# Patient Record
Sex: MALE | Race: WHITE | Employment: FULL TIME | URBAN - METROPOLITAN AREA
[De-identification: names, ages, dates, MRNs, and addresses within clinical notes are randomized per-mention and may not be internally consistent; named-entity substitution may affect disease eponyms.]

---

## 2018-11-09 ENCOUNTER — HOSPITAL ENCOUNTER (INPATIENT)
Facility: HOSPITAL | Age: 53
LOS: 2 days | Discharge: HOME | DRG: 440 | End: 2018-11-11
Attending: EMERGENCY MEDICINE | Admitting: HOSPITALIST
Payer: COMMERCIAL

## 2018-11-09 DIAGNOSIS — K85.90 ACUTE PANCREATITIS, UNSPECIFIED COMPLICATION STATUS, UNSPECIFIED PANCREATITIS TYPE: Primary | ICD-10-CM

## 2018-11-09 PROCEDURE — 87086 URINE CULTURE/COLONY COUNT: CPT | Performed by: INTERNAL MEDICINE

## 2018-11-09 PROCEDURE — 81001 URINALYSIS AUTO W/SCOPE: CPT | Performed by: INTERNAL MEDICINE

## 2018-11-09 PROCEDURE — 99285 EMERGENCY DEPT VISIT HI MDM: CPT

## 2018-11-09 PROCEDURE — 36415 COLL VENOUS BLD VENIPUNCTURE: CPT

## 2018-11-09 RX ORDER — SODIUM CHLORIDE 0.9 % (FLUSH) 0.9 %
3 SYRINGE (ML) INJECTION AS NEEDED
Status: DISCONTINUED | OUTPATIENT
Start: 2018-11-09 | End: 2018-11-11

## 2018-11-09 RX ORDER — SODIUM CHLORIDE 9 MG/ML
INJECTION, SOLUTION INTRAVENOUS CONTINUOUS
Status: DISCONTINUED | OUTPATIENT
Start: 2018-11-09 | End: 2018-11-11

## 2018-11-09 RX ORDER — MORPHINE SULFATE 4 MG/ML
4 INJECTION, SOLUTION INTRAMUSCULAR; INTRAVENOUS EVERY 2 HOUR PRN
Status: DISCONTINUED | OUTPATIENT
Start: 2018-11-09 | End: 2018-11-11

## 2018-11-09 RX ORDER — POLYETHYLENE GLYCOL 3350 17 G/17G
17 POWDER, FOR SOLUTION ORAL DAILY PRN
Status: DISCONTINUED | OUTPATIENT
Start: 2018-11-09 | End: 2018-11-11

## 2018-11-09 RX ORDER — MORPHINE SULFATE 2 MG/ML
2 INJECTION, SOLUTION INTRAMUSCULAR; INTRAVENOUS EVERY 2 HOUR PRN
Status: DISCONTINUED | OUTPATIENT
Start: 2018-11-09 | End: 2018-11-11

## 2018-11-09 RX ORDER — DOCUSATE SODIUM 100 MG/1
100 CAPSULE, LIQUID FILLED ORAL 2 TIMES DAILY
Status: DISCONTINUED | OUTPATIENT
Start: 2018-11-09 | End: 2018-11-11

## 2018-11-09 RX ORDER — MORPHINE SULFATE 2 MG/ML
1 INJECTION, SOLUTION INTRAMUSCULAR; INTRAVENOUS EVERY 2 HOUR PRN
Status: DISCONTINUED | OUTPATIENT
Start: 2018-11-09 | End: 2018-11-11

## 2018-11-09 RX ORDER — ACETAMINOPHEN 325 MG/1
650 TABLET ORAL EVERY 6 HOURS PRN
Status: DISCONTINUED | OUTPATIENT
Start: 2018-11-09 | End: 2018-11-11

## 2018-11-09 RX ORDER — METOCLOPRAMIDE HYDROCHLORIDE 5 MG/ML
10 INJECTION INTRAMUSCULAR; INTRAVENOUS EVERY 8 HOURS PRN
Status: DISCONTINUED | OUTPATIENT
Start: 2018-11-09 | End: 2018-11-11

## 2018-11-09 RX ORDER — ENOXAPARIN SODIUM 100 MG/ML
40 INJECTION SUBCUTANEOUS DAILY
Status: DISCONTINUED | OUTPATIENT
Start: 2018-11-09 | End: 2018-11-11

## 2018-11-09 RX ORDER — SODIUM CHLORIDE 9 MG/ML
125 INJECTION, SOLUTION INTRAVENOUS CONTINUOUS
Status: DISCONTINUED | OUTPATIENT
Start: 2018-11-09 | End: 2018-11-11

## 2018-11-09 RX ORDER — BISACODYL 10 MG
10 SUPPOSITORY, RECTAL RECTAL
Status: DISCONTINUED | OUTPATIENT
Start: 2018-11-09 | End: 2018-11-11

## 2018-11-09 RX ORDER — ONDANSETRON 2 MG/ML
4 INJECTION INTRAMUSCULAR; INTRAVENOUS EVERY 6 HOURS PRN
Status: DISCONTINUED | OUTPATIENT
Start: 2018-11-09 | End: 2018-11-11

## 2018-11-09 NOTE — ED INITIAL ASSESSMENT (HPI)
Patient presents to triage from PCP's office with complaints of abdominal pain associated with N/V onset Sunday. Had outpatient CT scan this afternoon and was diagnosed with pancreatitis.   Was told to come to ED for evaluation and possible admission per P

## 2018-11-09 NOTE — ED NOTES
Orders for admission, patient is aware of plan and ready to go upstairs.  Any questions, please call ED RN Pardeep Alvarez  at extension [phone]

## 2018-11-09 NOTE — ED PROVIDER NOTES
Patient Seen in: [facility] AND [facility] Emergency Department    History   Patient presents with:  Abdomen/Flank Pain (GI/)    Stated Complaint: abdominal pain - dx'd with pancreatitis    HPI    Patient is a 76-year-old male with a history of hyperlipidemia well-nourished. HEENT:   Head: Normocephalic and atraumatic.    Right Ear: External ear normal.   Left Ear: External ear normal.   Nose: Nose normal.   Mouth/Throat: Oropharynx is clear and moist.   Eyes: Conjunctivae and EOM are normal. Pupils are equal, unspecified pancreatitis type  (primary encounter diagnosis)    Disposition:  Admit  11/9/2018  2:32 pm    Follow-up:  No follow-up provider specified.       Medications Prescribed:  Current Discharge Medication List        Present on Admission  Date Review

## 2018-11-09 NOTE — H&P
[facility] Hospitalist H&P     CC: Patient presents with:  Abdomen/Flank Pain (GI/)       PCP: Christine Castaneda MD      Assessment and Plan     Brittney Redmond is a 46year old male with no known PMhx. Who presented with 6 days of epigastric abd pain.   CT c/w webb Dex Handy  History reviewed. No pertinent surgical history.      ALL:  No Known Allergies     Home Medications:    Outpatient Medications Marked as Taking [facility]):  ondansetron 8 MG Oral Tablet Dispersible DIS 1 T ON THE the pubic symphysis after the injection of nonionic intravenous contrast.  Oral contrast was not used for the examination. 80 mL of Isovue 370 were administered intravenously.  Automated exposure control and ALARA manual techniques for patient specific dos PERITONEUM/RETROPERITONEUM: No other free fluid or loculated fluid collections are seen in the abdomen and pelvis. ABDOMINAL WALL: Normal. OSSEOUS STRUCTURES: There are degenerative changes in the lumbar spine. IMPRESSION: 1.  Findings compatible with pa

## 2018-11-10 ENCOUNTER — APPOINTMENT (OUTPATIENT)
Dept: ULTRASOUND IMAGING | Facility: HOSPITAL | Age: 53
DRG: 440 | End: 2018-11-10
Attending: HOSPITALIST
Payer: COMMERCIAL

## 2018-11-10 PROCEDURE — 83036 HEMOGLOBIN GLYCOSYLATED A1C: CPT | Performed by: HOSPITALIST

## 2018-11-10 PROCEDURE — 80061 LIPID PANEL: CPT | Performed by: HOSPITALIST

## 2018-11-10 PROCEDURE — 83690 ASSAY OF LIPASE: CPT | Performed by: HOSPITALIST

## 2018-11-10 PROCEDURE — 76705 ECHO EXAM OF ABDOMEN: CPT | Performed by: HOSPITALIST

## 2018-11-10 PROCEDURE — 80053 COMPREHEN METABOLIC PANEL: CPT | Performed by: HOSPITALIST

## 2018-11-10 PROCEDURE — 85025 COMPLETE CBC W/AUTO DIFF WBC: CPT | Performed by: HOSPITALIST

## 2018-11-10 NOTE — CM/SW NOTE
SW received MDO for discharge planning. Per RN, pt is from home w/ spouse, self care. RN anticipates that pt will not have any discharge needs at this time. SW/CM to remain available for support and/or discharge planning.      Mark Martinez

## 2018-11-10 NOTE — PROGRESS NOTES
[facility] Hospitalist Progress Note     PCP: Violet Jackson MD    CC: Follow up       Assessment/Plan:     Josh Richardson is a 46year old male with no known PMhx. Who presented with 6 days of epigastric abd pain. CT c/w pancreatitis.   Enzymes normal     Acute perfused  Psych: mood stable, cooperative  Neuro: no focal deficits    Medications     • enoxaparin  40 mg Subcutaneous Daily   • docusate sodium  100 mg Oral BID     • sodium chloride 125 mL/hr (11/09/18 1512)   • sodium chloride 125 mL/hr at 11/10/18 083

## 2018-11-10 NOTE — RESPIRATORY THERAPY NOTE
RAMILA ASSESSMENT:    Pt does have a previous diagnosis of RAMILA. Pt does routinely use a CPAP device at home. Pt will use home CPAP per pt.

## 2018-11-10 NOTE — PLAN OF CARE
Problem: Patient Centered Care  Goal: Patient preferences are identified and integrated in the patient's plan of care  Interventions:  - What would you like us to know as we care for you?   - Provide timely, complete, and accurate information to patient/fa Administer growth factors as ordered  - Implement neutropenic guidelines  Outcome: Progressing      Problem: DISCHARGE PLANNING  Goal: Discharge to home or other facility with appropriate resources  INTERVENTIONS:  - Identify barriers to discharge w/pt and

## 2018-11-11 ENCOUNTER — APPOINTMENT (OUTPATIENT)
Dept: CT IMAGING | Facility: HOSPITAL | Age: 53
DRG: 440 | End: 2018-11-11
Attending: HOSPITALIST
Payer: COMMERCIAL

## 2018-11-11 VITALS
SYSTOLIC BLOOD PRESSURE: 119 MMHG | DIASTOLIC BLOOD PRESSURE: 67 MMHG | WEIGHT: 224 LBS | HEIGHT: 73 IN | TEMPERATURE: 99 F | BODY MASS INDEX: 29.69 KG/M2 | RESPIRATION RATE: 18 BRPM | HEART RATE: 62 BPM | OXYGEN SATURATION: 96 %

## 2018-11-11 PROCEDURE — 80076 HEPATIC FUNCTION PANEL: CPT | Performed by: HOSPITALIST

## 2018-11-11 PROCEDURE — 74177 CT ABD & PELVIS W/CONTRAST: CPT | Performed by: HOSPITALIST

## 2018-11-11 PROCEDURE — 90471 IMMUNIZATION ADMIN: CPT

## 2018-11-11 PROCEDURE — 85025 COMPLETE CBC W/AUTO DIFF WBC: CPT | Performed by: HOSPITALIST

## 2018-11-11 PROCEDURE — 80048 BASIC METABOLIC PNL TOTAL CA: CPT | Performed by: HOSPITALIST

## 2018-11-11 RX ORDER — ATORVASTATIN CALCIUM 20 MG/1
20 TABLET, FILM COATED ORAL NIGHTLY
Qty: 30 TABLET | Refills: 0 | Status: SHIPPED | OUTPATIENT
Start: 2018-11-11 | End: 2018-12-03

## 2018-11-11 NOTE — PLAN OF CARE
Problem: Patient Centered Care  Goal: Patient preferences are identified and integrated in the patient's plan of care  Interventions:  - What would you like us to know as we care for you?   - Provide timely, complete, and accurate information to patient/fa period  INTERVENTIONS  - Monitor WBC  - Administer growth factors as ordered  - Implement neutropenic guidelines  Outcome: Adequate for Discharge      Problem: DISCHARGE PLANNING  Goal: Discharge to home or other facility with appropriate resources  INTERV

## 2018-11-11 NOTE — PLAN OF CARE
Patient discharge home with wife at bedside, IV removed, informed pt and wife of new meds and to follow up with PCP.

## 2018-11-11 NOTE — PLAN OF CARE
Problem: Patient/Family Goals  Goal: Patient/Family Long Term Goal  Patient's Long Term Goal: to go home    Interventions:  - IV fluids  - SW to assist with d/c planning  - NPO  - pain management  - See additional Care Plan goals for specific interventions preferences of patient/family/discharge partner  - Complete POLST form as appropriate  - Assess patient's ability to be responsible for managing their own health  - Refer to Case Management Department for coordinating discharge planning if the patient need

## 2018-11-12 NOTE — DISCHARGE SUMMARY
[facility] Internal Medicine Discharge Summary   Patient ID:  Brittney Redmond  [id number]  23 year old  9/16/1965    Admit date: 11/9/2018    Discharge date and time: 11/11/2018  2:24 PM     Attending Physician: No att. providers found     Primary Care Physician: Tommie Gerber [address], [phone], [facility], [address] [id number]    Phone:  [phone]   · atorvastatin 20 MG Tabs  · MetFORMIN HCl 500 MG Tabs possible thrombus vs artifact but does show extensive inflammation of body and tail. No pseudocyst or necrosis but will need follow up. D/W patient  -rec follow up with PCP and GI.   Also needs screening colonoscopy    HL  -LDL, HDL and TG all not at goal MD ON 11/10/2018 AT 16:18          Ct Abdomen+pelvis(contrast Only)(cpt=74177)    Result Date: 11/11/2018  PROCEDURE: CT ABDOMEN + PELVIS (CONTRAST ONLY) (PAN=[id number])  COMPARISON: [address] [place] [address] (URS=[id number]), 11/10/2018, 12:23 enlarged lymph nodes. BLADDER: Under distended. PELVIC ORGANS: No visible mass. BONES: Multilevel degenerative changes of the visualized spine. No acute-appearing fracture or suspicious osseous lesion.  LUNG BASES: Bibasilar atelectasis at the visuali The pancreas body and tail appear to be somewhat enlarged and there is prominent stranding and fluid seen surrounding the body and tail of the pancreas extending into the lesser sac and in between the stomach and spleen and inferiorly in the retroperitoneu Follow-up is recommended 3. Bilateral renal cysts 4. Colonic diverticulosis 5.  Mild hepatic steatosis The findings were discussed with Dr. Cordell Myrick on 11/9/2018 at 12:24 PM       Operative Procedures:        Day of discharge feels well, tolerating diet, no CP

## 2018-11-12 NOTE — PAYOR COMM NOTE
--------------  ADMISSION REVIEW     Payor: [address] [place] [address] POS/CHEIKH  Subscriber #:  [id number]  Authorization Number: [id number]    Admit date: 11/9/18  Admit time: 1176           Patient Seen in: [facility] Emergency Department    History   Patient pres normal. Pupils are equal, round, and reactive to light. Neck: Neck supple. Cardiovascular: Normal rate, regular rhythm, normal heart sounds and intact distal pulses. Pulmonary/Chest: Effort normal and breath sounds normal. No respiratory distress. normal gallbladder  -clinically improving  -NPO, IVF, antiemetics, pain meds  -monitor    Artifact vs nonocclusive thrombus  -will d/w radiology timing of repeat study    FEN  -IVF, lytes in AM, NPO    PPX.  Lovenox SQ    HPI     Kaylee Love is a 46 year Labs   Lab  11/09/18   1119   WBC  9.06   HGB  13.6   MCV  91.4   PLT  281       Recent Labs   Lab  11/09/18   1119   NA  138   K  3.70   CL  98*   CO2  31.9   BUN  17   CREATSERUM  1.24   GLU  146*       Recent Labs   Lab  11/09/18   1119   ALT  61   AST recommended. SPLEEN: Normal ADRENAL GLANDS: Normal. KIDNEYS URETERS AND BLADDER: There is a simple cyst measuring 4.3 x 3.9 cm in the right kidney mid to inferior pole. There is also a cyst measuring 9 mm in the left kidney inferior pole.  There is no hydro AM, clears     PPX.  Lovenox SQ     Dispo pending course, full code     Subjective      Had multiple BM's today and had more pain/nausea post.  Now improved, will try clears after US     No CP, SOB        Objective      OBJECTIVE:  Temp:  [99.1 °F (37.3 °C) 11/11/2018

## 2018-11-19 PROCEDURE — 83516 IMMUNOASSAY NONANTIBODY: CPT | Performed by: INTERNAL MEDICINE

## 2018-11-19 PROCEDURE — 84075 ASSAY ALKALINE PHOSPHATASE: CPT | Performed by: INTERNAL MEDICINE

## 2018-11-19 PROCEDURE — 82787 IGG 1 2 3 OR 4 EACH: CPT | Performed by: INTERNAL MEDICINE

## 2018-11-19 PROCEDURE — 83525 ASSAY OF INSULIN: CPT | Performed by: INTERNAL MEDICINE

## 2018-11-19 PROCEDURE — 84080 ASSAY ALKALINE PHOSPHATASES: CPT | Performed by: INTERNAL MEDICINE

## 2018-11-19 PROCEDURE — 82784 ASSAY IGA/IGD/IGG/IGM EACH: CPT | Performed by: INTERNAL MEDICINE

## 2018-11-19 PROCEDURE — 86337 INSULIN ANTIBODIES: CPT | Performed by: INTERNAL MEDICINE

## 2018-11-19 PROCEDURE — 82785 ASSAY OF IGE: CPT | Performed by: INTERNAL MEDICINE

## 2018-12-29 PROCEDURE — 82043 UR ALBUMIN QUANTITATIVE: CPT | Performed by: INTERNAL MEDICINE

## 2018-12-29 PROCEDURE — 82570 ASSAY OF URINE CREATININE: CPT | Performed by: INTERNAL MEDICINE

## 2019-01-31 ENCOUNTER — HOSPITAL ENCOUNTER (OUTPATIENT)
Facility: HOSPITAL | Age: 54
Setting detail: HOSPITAL OUTPATIENT SURGERY
Discharge: HOME | End: 2019-01-31
Attending: INTERNAL MEDICINE | Admitting: INTERNAL MEDICINE
Payer: COMMERCIAL

## 2019-01-31 ENCOUNTER — ANESTHESIA EVENT (OUTPATIENT)
Dept: ENDOSCOPY | Facility: HOSPITAL | Age: 54
End: 2019-01-31
Payer: COMMERCIAL

## 2019-01-31 ENCOUNTER — ANESTHESIA (OUTPATIENT)
Dept: ENDOSCOPY | Facility: HOSPITAL | Age: 54
End: 2019-01-31
Payer: COMMERCIAL

## 2019-01-31 VITALS
SYSTOLIC BLOOD PRESSURE: 107 MMHG | DIASTOLIC BLOOD PRESSURE: 60 MMHG | BODY MASS INDEX: 27.83 KG/M2 | RESPIRATION RATE: 14 BRPM | WEIGHT: 210 LBS | OXYGEN SATURATION: 96 % | HEIGHT: 73 IN | HEART RATE: 57 BPM

## 2019-01-31 DIAGNOSIS — Z87.19 HISTORY OF ACUTE PANCREATITIS: ICD-10-CM

## 2019-01-31 LAB — GLUCOSE BLDC GLUCOMTR-MCNC: 98 MG/DL (ref 70–99)

## 2019-01-31 PROCEDURE — 82962 GLUCOSE BLOOD TEST: CPT

## 2019-01-31 PROCEDURE — 0DJD8ZZ INSPECTION OF LOWER INTESTINAL TRACT, VIA NATURAL OR ARTIFICIAL OPENING ENDOSCOPIC: ICD-10-PCS | Performed by: INTERNAL MEDICINE

## 2019-01-31 PROCEDURE — 0DJ08ZZ INSPECTION OF UPPER INTESTINAL TRACT, VIA NATURAL OR ARTIFICIAL OPENING ENDOSCOPIC: ICD-10-PCS | Performed by: INTERNAL MEDICINE

## 2019-01-31 RX ORDER — LIDOCAINE HYDROCHLORIDE 10 MG/ML
INJECTION, SOLUTION EPIDURAL; INFILTRATION; INTRACAUDAL; PERINEURAL AS NEEDED
Status: DISCONTINUED | OUTPATIENT
Start: 2019-01-31 | End: 2019-01-31 | Stop reason: SURG

## 2019-01-31 RX ORDER — SODIUM CHLORIDE, SODIUM LACTATE, POTASSIUM CHLORIDE, CALCIUM CHLORIDE 600; 310; 30; 20 MG/100ML; MG/100ML; MG/100ML; MG/100ML
INJECTION, SOLUTION INTRAVENOUS CONTINUOUS
Status: CANCELLED | OUTPATIENT
Start: 2019-01-31

## 2019-01-31 RX ORDER — MIDAZOLAM HYDROCHLORIDE 1 MG/ML
INJECTION INTRAMUSCULAR; INTRAVENOUS AS NEEDED
Status: DISCONTINUED | OUTPATIENT
Start: 2019-01-31 | End: 2019-01-31 | Stop reason: SURG

## 2019-01-31 RX ORDER — SODIUM CHLORIDE, SODIUM LACTATE, POTASSIUM CHLORIDE, CALCIUM CHLORIDE 600; 310; 30; 20 MG/100ML; MG/100ML; MG/100ML; MG/100ML
INJECTION, SOLUTION INTRAVENOUS CONTINUOUS
Status: DISCONTINUED | OUTPATIENT
Start: 2019-01-31 | End: 2019-01-31

## 2019-01-31 RX ORDER — DEXTROSE MONOHYDRATE 25 G/50ML
50 INJECTION, SOLUTION INTRAVENOUS
Status: CANCELLED | OUTPATIENT
Start: 2019-01-31

## 2019-01-31 RX ORDER — GLYCOPYRROLATE 0.2 MG/ML
INJECTION INTRAMUSCULAR; INTRAVENOUS AS NEEDED
Status: DISCONTINUED | OUTPATIENT
Start: 2019-01-31 | End: 2019-01-31 | Stop reason: SURG

## 2019-01-31 RX ORDER — NALOXONE HYDROCHLORIDE 0.4 MG/ML
80 INJECTION, SOLUTION INTRAMUSCULAR; INTRAVENOUS; SUBCUTANEOUS AS NEEDED
Status: CANCELLED | OUTPATIENT
Start: 2019-01-31 | End: 2019-01-31

## 2019-01-31 RX ADMIN — GLYCOPYRROLATE 0.2 MG: 0.2 INJECTION INTRAMUSCULAR; INTRAVENOUS at 14:48:00

## 2019-01-31 RX ADMIN — SODIUM CHLORIDE, SODIUM LACTATE, POTASSIUM CHLORIDE, CALCIUM CHLORIDE: 600; 310; 30; 20 INJECTION, SOLUTION INTRAVENOUS at 14:42:00

## 2019-01-31 RX ADMIN — MIDAZOLAM HYDROCHLORIDE 1 MG: 1 INJECTION INTRAMUSCULAR; INTRAVENOUS at 14:50:00

## 2019-01-31 RX ADMIN — LIDOCAINE HYDROCHLORIDE 50 MG: 10 INJECTION, SOLUTION EPIDURAL; INFILTRATION; INTRACAUDAL; PERINEURAL at 14:51:00

## 2019-01-31 RX ADMIN — SODIUM CHLORIDE, SODIUM LACTATE, POTASSIUM CHLORIDE, CALCIUM CHLORIDE: 600; 310; 30; 20 INJECTION, SOLUTION INTRAVENOUS at 15:35:00

## 2019-01-31 RX ADMIN — MIDAZOLAM HYDROCHLORIDE 1 MG: 1 INJECTION INTRAMUSCULAR; INTRAVENOUS at 14:48:00

## 2019-01-31 NOTE — H&P
[facility] Department of  Gastroenterology  Update Health History :       Jovanny Pruett  male   Luciano Ware MD     [id number]  9/16/1965 Primary Care Physician  Daniella Smith MD        HPI :  Single attack of acute pancreatitis. ,  Renata Beavers and benefits of the procedure were discussed in detail with the patient, alternatives and options were all discussed, all questions were answered, patient verbalized understanding and agreed to proceed with procedure as scheduled.       Parker Jackson MD

## 2019-01-31 NOTE — ANESTHESIA PREPROCEDURE EVALUATION
Anesthesia PreOp Note    HPI:     Eva Garcia is a 46year old male who presents for preoperative consultation requested by: Aren Vidales MD    Date of Surgery: 1/31/2019    Procedure(s):  ENDOSCOPIC ULTRASOUND (EUS)  COLONOSCOPY  Indication: Histor Socioeconomic History      Marital status:       Spouse name: Not on file      Number of children: Not on file      Years of education: Not on file      Highest education level: Not on file    Social Needs      Financial resource strain: Not on dane auscultation  (+) sleep apnea on CPAP,   Cardiovascular   (+) valvular problems/murmurs,     ECG reviewed  Rhythm: regular  Rate: normal  ROS comment: Severe bradycardia nl    Neuro/Psych      GI/Hepatic/Renal      Comments: Hx pancreatitis    Endo/Other

## 2019-01-31 NOTE — ANESTHESIA POSTPROCEDURE EVALUATION
Patient: Karina Simms    Procedure Summary     Date:  01/31/19 Room / Location:  [facility] ENDOSCOPY 01 / [facility] ENDOSCOPY    Anesthesia Start:  8223 Anesthesia Stop:  0860    Procedures:       ENDOSCOPIC ULTRASOUND (EUS) (N/A )      COLONOSCOPY (N/A ) Diagnosis:

## 2019-01-31 NOTE — OPERATIVE REPORT
PATIENT NAME: Brandi Mehta  MRN: [id number]  DATE OF OPERATION: 1/31/2019  PREOPERATIVE DIAGNOSIS:   1. Acute pancreatitis  2. Screening for colorectal cancer, average risk.   POSTOPERATIVE DIAGNOSES:  1. Small area of inflammatory changes near the tail of adherent to the gallbladder wall in the range of 3 mm most likely represent cholesterol polyps. The liver had some hyper echogenicity to the parenchyma consistent with fatty infiltration. Scope was then removed.     The patient was then rotated 180 degree

## 2019-04-09 PROCEDURE — 88304 TISSUE EXAM BY PATHOLOGIST: CPT | Performed by: SURGERY

## 2020-11-13 PROBLEM — E11.9 TYPE 2 DIABETES MELLITUS WITHOUT COMPLICATION, WITHOUT LONG-TERM CURRENT USE OF INSULIN (HCC): Status: ACTIVE | Noted: 2020-11-13

## 2021-02-26 ENCOUNTER — HOSPITAL ENCOUNTER (OUTPATIENT)
Age: 56
Discharge: HOME | End: 2021-02-26
Payer: COMMERCIAL

## 2021-02-26 VITALS
OXYGEN SATURATION: 98 % | WEIGHT: 210 LBS | DIASTOLIC BLOOD PRESSURE: 65 MMHG | SYSTOLIC BLOOD PRESSURE: 114 MMHG | RESPIRATION RATE: 16 BRPM | HEIGHT: 73 IN | TEMPERATURE: 98 F | HEART RATE: 51 BPM | BODY MASS INDEX: 27.83 KG/M2

## 2021-02-26 DIAGNOSIS — Z20.822 LAB TEST NEGATIVE FOR COVID-19 VIRUS: ICD-10-CM

## 2021-02-26 DIAGNOSIS — Z20.822 ENCOUNTER FOR SCREENING LABORATORY TESTING FOR COVID-19 VIRUS: Primary | ICD-10-CM

## 2021-02-26 LAB — SARS-COV-2 RNA RESP QL NAA+PROBE: NOT DETECTED

## 2021-02-26 PROCEDURE — 99203 OFFICE O/P NEW LOW 30 MIN: CPT | Performed by: NURSE PRACTITIONER

## 2021-02-26 PROCEDURE — U0002 COVID-19 LAB TEST NON-CDC: HCPCS | Performed by: NURSE PRACTITIONER

## 2021-02-26 NOTE — ED PROVIDER NOTES
Patient Seen in: [facility]      History   Patient presents with:  Testing    Stated Complaint: testing - exposure    HPI/Subjective:   HPI    63yr old male with with a history of diabetes, high cholesterol here today with no symptoms for a General: Patient is awake and alert, oriented to person, place and time. Not in acute distress. HEENT: Head is normocephalic atraumatic. Pupils reactive bilaterally. EOMs intact. No facial droop or slurred speech. No oral pallor.  Mucous membran worn throughout encounter. See note and/or contact this provider for further PPE details.                          Disposition and Plan     Clinical Impression:  Encounter for screening laboratory testing for COVID-19 virus  (primary encounter diagnosis)  L

## 2021-02-26 NOTE — ED INITIAL ASSESSMENT (HPI)
No symptoms. Pt states covid exposure 2/20. No cold/cough. No fever. Awake/alert. Breathing easy and even without distress. Speech clear. Skin warm, dry and pink.

## 2021-04-12 DIAGNOSIS — Z23 NEED FOR VACCINATION: ICD-10-CM

## (undated) DEVICE — Device: Brand: BALLOON3

## (undated) DEVICE — CONMED SCOPE SAVER BITE BLOCK, 20X27 MM: Brand: SCOPE SAVER

## (undated) DEVICE — CANNULA NASAL 02/C02 ADULT

## (undated) DEVICE — Device: Brand: CUSTOM PROCEDURE KIT

## (undated) DEVICE — LINE MNTR ADLT SET O2 INTMD